# Patient Record
Sex: MALE | Race: WHITE | Employment: OTHER | URBAN - METROPOLITAN AREA
[De-identification: names, ages, dates, MRNs, and addresses within clinical notes are randomized per-mention and may not be internally consistent; named-entity substitution may affect disease eponyms.]

---

## 2017-12-04 PROBLEM — E11.42 DIABETIC POLYNEUROPATHY ASSOCIATED WITH TYPE 2 DIABETES MELLITUS (HCC): Status: ACTIVE | Noted: 2017-12-04

## 2017-12-04 PROBLEM — G35 MULTIPLE SCLEROSIS (HCC): Status: ACTIVE | Noted: 2017-12-04

## 2018-08-22 PROBLEM — F41.9 ANXIETY: Status: ACTIVE | Noted: 2018-08-22

## 2019-05-20 RX ORDER — SODIUM CHLORIDE 0.9 % (FLUSH) 0.9 %
5-40 SYRINGE (ML) INJECTION EVERY 8 HOURS
Status: CANCELLED | OUTPATIENT
Start: 2019-05-20

## 2019-05-20 RX ORDER — SODIUM CHLORIDE 0.9 % (FLUSH) 0.9 %
5-40 SYRINGE (ML) INJECTION AS NEEDED
Status: CANCELLED | OUTPATIENT
Start: 2019-05-20

## 2019-05-23 ENCOUNTER — HOSPITAL ENCOUNTER (OUTPATIENT)
Age: 69
Setting detail: OUTPATIENT SURGERY
Discharge: HOME | End: 2019-05-23
Attending: ORTHOPAEDIC SURGERY | Admitting: ORTHOPAEDIC SURGERY
Payer: MEDICARE

## 2019-05-23 ENCOUNTER — ANESTHESIA EVENT (OUTPATIENT)
Dept: SURGERY | Age: 69
End: 2019-05-23
Payer: MEDICARE

## 2019-05-23 ENCOUNTER — ANESTHESIA (OUTPATIENT)
Dept: SURGERY | Age: 69
End: 2019-05-23
Payer: MEDICARE

## 2019-05-23 VITALS
BODY MASS INDEX: 24.39 KG/M2 | DIASTOLIC BLOOD PRESSURE: 66 MMHG | SYSTOLIC BLOOD PRESSURE: 131 MMHG | TEMPERATURE: 98 F | RESPIRATION RATE: 16 BRPM | HEART RATE: 43 BPM | WEIGHT: 190 LBS | OXYGEN SATURATION: 96 %

## 2019-05-23 LAB — GLUCOSE BLD STRIP.AUTO-MCNC: 96 MG/DL (ref 65–100)

## 2019-05-23 PROCEDURE — 74011250636 HC RX REV CODE- 250/636

## 2019-05-23 PROCEDURE — 76010000154 HC OR TIME FIRST 0.5 HR: Performed by: ORTHOPAEDIC SURGERY

## 2019-05-23 PROCEDURE — 77030036649: Performed by: ORTHOPAEDIC SURGERY

## 2019-05-23 PROCEDURE — 74011250636 HC RX REV CODE- 250/636: Performed by: ANESTHESIOLOGY

## 2019-05-23 PROCEDURE — 74011000250 HC RX REV CODE- 250: Performed by: ORTHOPAEDIC SURGERY

## 2019-05-23 PROCEDURE — 82962 GLUCOSE BLOOD TEST: CPT

## 2019-05-23 PROCEDURE — 76210000063 HC OR PH I REC FIRST 0.5 HR: Performed by: ORTHOPAEDIC SURGERY

## 2019-05-23 PROCEDURE — 76060000032 HC ANESTHESIA 0.5 TO 1 HR: Performed by: ORTHOPAEDIC SURGERY

## 2019-05-23 PROCEDURE — 74011250637 HC RX REV CODE- 250/637: Performed by: ANESTHESIOLOGY

## 2019-05-23 PROCEDURE — 74011250636 HC RX REV CODE- 250/636: Performed by: ORTHOPAEDIC SURGERY

## 2019-05-23 PROCEDURE — 76210000020 HC REC RM PH II FIRST 0.5 HR: Performed by: ORTHOPAEDIC SURGERY

## 2019-05-23 PROCEDURE — 77030019908 HC STETH ESOPH SIMS -A: Performed by: ANESTHESIOLOGY

## 2019-05-23 RX ORDER — FENTANYL CITRATE 50 UG/ML
100 INJECTION, SOLUTION INTRAMUSCULAR; INTRAVENOUS ONCE
Status: DISCONTINUED | OUTPATIENT
Start: 2019-05-23 | End: 2019-05-23 | Stop reason: HOSPADM

## 2019-05-23 RX ORDER — SODIUM CHLORIDE 0.9 % (FLUSH) 0.9 %
5-40 SYRINGE (ML) INJECTION EVERY 8 HOURS
Status: DISCONTINUED | OUTPATIENT
Start: 2019-05-23 | End: 2019-05-23 | Stop reason: HOSPADM

## 2019-05-23 RX ORDER — SODIUM CHLORIDE, SODIUM LACTATE, POTASSIUM CHLORIDE, CALCIUM CHLORIDE 600; 310; 30; 20 MG/100ML; MG/100ML; MG/100ML; MG/100ML
75 INJECTION, SOLUTION INTRAVENOUS CONTINUOUS
Status: DISCONTINUED | OUTPATIENT
Start: 2019-05-23 | End: 2019-05-23 | Stop reason: HOSPADM

## 2019-05-23 RX ORDER — LIDOCAINE HYDROCHLORIDE 10 MG/ML
0.1 INJECTION INFILTRATION; PERINEURAL AS NEEDED
Status: DISCONTINUED | OUTPATIENT
Start: 2019-05-23 | End: 2019-05-23 | Stop reason: HOSPADM

## 2019-05-23 RX ORDER — CEFAZOLIN SODIUM/WATER 2 G/20 ML
2 SYRINGE (ML) INTRAVENOUS ONCE
Status: COMPLETED | OUTPATIENT
Start: 2019-05-23 | End: 2019-05-23

## 2019-05-23 RX ORDER — MIDAZOLAM HYDROCHLORIDE 1 MG/ML
2 INJECTION, SOLUTION INTRAMUSCULAR; INTRAVENOUS ONCE
Status: DISCONTINUED | OUTPATIENT
Start: 2019-05-23 | End: 2019-05-23 | Stop reason: HOSPADM

## 2019-05-23 RX ORDER — SODIUM CHLORIDE 0.9 % (FLUSH) 0.9 %
5-40 SYRINGE (ML) INJECTION AS NEEDED
Status: DISCONTINUED | OUTPATIENT
Start: 2019-05-23 | End: 2019-05-23 | Stop reason: HOSPADM

## 2019-05-23 RX ORDER — LIDOCAINE HYDROCHLORIDE 20 MG/ML
INJECTION, SOLUTION EPIDURAL; INFILTRATION; INTRACAUDAL; PERINEURAL AS NEEDED
Status: DISCONTINUED | OUTPATIENT
Start: 2019-05-23 | End: 2019-05-23 | Stop reason: HOSPADM

## 2019-05-23 RX ORDER — BUPIVACAINE HYDROCHLORIDE 5 MG/ML
INJECTION, SOLUTION EPIDURAL; INTRACAUDAL AS NEEDED
Status: DISCONTINUED | OUTPATIENT
Start: 2019-05-23 | End: 2019-05-23 | Stop reason: HOSPADM

## 2019-05-23 RX ORDER — OXYCODONE HYDROCHLORIDE 5 MG/1
5 TABLET ORAL
Status: DISCONTINUED | OUTPATIENT
Start: 2019-05-23 | End: 2019-05-23 | Stop reason: HOSPADM

## 2019-05-23 RX ORDER — PROPOFOL 10 MG/ML
INJECTION, EMULSION INTRAVENOUS
Status: DISCONTINUED | OUTPATIENT
Start: 2019-05-23 | End: 2019-05-23 | Stop reason: HOSPADM

## 2019-05-23 RX ORDER — PROPOFOL 10 MG/ML
INJECTION, EMULSION INTRAVENOUS AS NEEDED
Status: DISCONTINUED | OUTPATIENT
Start: 2019-05-23 | End: 2019-05-23 | Stop reason: HOSPADM

## 2019-05-23 RX ORDER — HYDROMORPHONE HYDROCHLORIDE 2 MG/ML
0.5 INJECTION, SOLUTION INTRAMUSCULAR; INTRAVENOUS; SUBCUTANEOUS
Status: DISCONTINUED | OUTPATIENT
Start: 2019-05-23 | End: 2019-05-23 | Stop reason: HOSPADM

## 2019-05-23 RX ORDER — LIDOCAINE HYDROCHLORIDE 10 MG/ML
INJECTION INFILTRATION; PERINEURAL AS NEEDED
Status: DISCONTINUED | OUTPATIENT
Start: 2019-05-23 | End: 2019-05-23 | Stop reason: HOSPADM

## 2019-05-23 RX ORDER — OXYCODONE HYDROCHLORIDE 5 MG/1
10 TABLET ORAL
Status: DISCONTINUED | OUTPATIENT
Start: 2019-05-23 | End: 2019-05-23 | Stop reason: HOSPADM

## 2019-05-23 RX ORDER — FAMOTIDINE 20 MG/1
20 TABLET, FILM COATED ORAL ONCE
Status: COMPLETED | OUTPATIENT
Start: 2019-05-23 | End: 2019-05-23

## 2019-05-23 RX ADMIN — PROPOFOL 140 MCG/KG/MIN: 10 INJECTION, EMULSION INTRAVENOUS at 09:21

## 2019-05-23 RX ADMIN — Medication 2 G: at 09:25

## 2019-05-23 RX ADMIN — LIDOCAINE HYDROCHLORIDE 60 MG: 20 INJECTION, SOLUTION EPIDURAL; INFILTRATION; INTRACAUDAL; PERINEURAL at 09:31

## 2019-05-23 RX ADMIN — SODIUM CHLORIDE, SODIUM LACTATE, POTASSIUM CHLORIDE, AND CALCIUM CHLORIDE 75 ML/HR: 600; 310; 30; 20 INJECTION, SOLUTION INTRAVENOUS at 08:31

## 2019-05-23 RX ADMIN — FAMOTIDINE 20 MG: 20 TABLET ORAL at 08:31

## 2019-05-23 RX ADMIN — PROPOFOL 50 MG: 10 INJECTION, EMULSION INTRAVENOUS at 09:21

## 2019-05-23 RX ADMIN — PROPOFOL 40 MG: 10 INJECTION, EMULSION INTRAVENOUS at 09:33

## 2019-05-23 NOTE — ANESTHESIA PREPROCEDURE EVALUATION
Relevant Problems   No relevant active problems       Anesthetic History               Review of Systems / Medical History  Patient summary reviewed and pertinent labs reviewed    Pulmonary                   Neuro/Psych         Neuromuscular disease     Cardiovascular                  Exercise tolerance: >4 METS     GI/Hepatic/Renal                Endo/Other    Diabetes: type 2         Other Findings   Comments: Multiple sclerosis         Physical Exam    Airway  Mallampati: II  TM Distance: > 6 cm  Neck ROM: normal range of motion   Mouth opening: Normal     Cardiovascular  Regular rate and rhythm,  S1 and S2 normal,  no murmur, click, rub, or gallop  Rhythm: regular  Rate: normal         Dental  No notable dental hx       Pulmonary  Breath sounds clear to auscultation               Abdominal         Other Findings            Anesthetic Plan    ASA: 2  Anesthesia type: total IV anesthesia      Post-op pain plan if not by surgeon: peripheral nerve block single      Anesthetic plan and risks discussed with: Patient

## 2019-05-23 NOTE — ANESTHESIA POSTPROCEDURE EVALUATION
Procedure(s):  LEFT MEDIAL EPICONDYLITIS DEBRIDEMENT WITH TENEX / ULTRA SOUND / PLEXUS.    total IV anesthesia    Anesthesia Post Evaluation      Multimodal analgesia: multimodal analgesia not used between 6 hours prior to anesthesia start to PACU discharge  Patient location during evaluation: PACU  Patient participation: complete - patient participated  Level of consciousness: awake and alert  Pain management: adequate  Airway patency: patent  Anesthetic complications: no  Cardiovascular status: hemodynamically stable  Respiratory status: acceptable  Hydration status: acceptable        Vitals Value Taken Time   /66 5/23/2019 10:15 AM   Temp 36.5 °C (97.7 °F) 5/23/2019  9:49 AM   Pulse 43 5/23/2019 10:15 AM   Resp 14 5/23/2019  9:54 AM   SpO2 96 % 5/23/2019 10:15 AM   Vitals shown include unvalidated device data.

## 2019-05-23 NOTE — DISCHARGE INSTRUCTIONS
Keep dressing clean, dry and intact until post op day number 3-4. Then may shower, pat dry as long as tegaderm  Is in place and keep covered until follow up. Do not scrub incision or submerge under water. Move fingers and elbow, elevate, and ice to prevent swelling. No heavy lifting. DIET  · Clear liquids until no nausea or vomiting; then light diet for the first day. · Advance to regular diet on second day, unless your doctor orders otherwise. · If nausea and vomiting continues, call your doctor. PAIN  · Take pain medication as directed by your doctor. · Call your doctor if pain is NOT relieved by medication. · DO NOT take aspirin of blood thinners unless directed by your doctor. CALL YOUR DOCTOR IF   · Excessive bleeding that does not stop after holding pressure over the area  · Temperature of 101 degrees F or above  · Excessive redness, swelling or bruising, and/ or green or yellow, smelly discharge from incision    AFTER ANESTHESIA   · For the first 24 hours: DO NOT Drive, Drink alcoholic beverages, or Make important decisions. · Be aware of dizziness following anesthesia and while taking pain medication. After general anesthesia or intravenous sedation, for 24 hours or while taking prescription Narcotics:  · Limit your activities  · A responsible adult needs to be with you for the next 24 hours  · Do not drive and operate hazardous machinery  · Do not make important personal or business decisions  · Do  not drink alcoholic beverages  · If you have not urinated within 8 hours after discharge, please contact your surgeon on call. *  Please give a list of your current medications to your Primary Care Provider. *  Please update this list whenever your medications are discontinued, doses are      changed, or new medications (including over-the-counter products) are added. *  Please carry medication information at all times in case of emergency situations.     These are general instructions for a healthy lifestyle:  No smoking/ No tobacco products/ Avoid exposure to second hand smoke  Surgeon General's Warning:  Quitting smoking now greatly reduces serious risk to your health. Obesity, smoking, and sedentary lifestyle greatly increases your risk for illness  A healthy diet, regular physical exercise & weight monitoring are important for maintaining a healthy lifestyle    You may be retaining fluid if you have a history of heart failure or if you experience any of the following symptoms:  Weight gain of 3 pounds or more overnight or 5 pounds in a week, increased swelling in our hands or feet or shortness of breath while lying flat in bed. Please call your doctor as soon as you notice any of these symptoms; do not wait until your next office visit. Recognize signs and symptoms of STROKE:  F-face looks uneven  A-arms unable to move or move unevenly  S-speech slurred or non-existent  T-time-call 911 as soon as signs and symptoms begin-DO NOT go       Back to bed or wait to see if you get better-TIME IS BRAIN.

## 2019-05-23 NOTE — BRIEF OP NOTE
BRIEF OPERATIVE NOTE    Date of Procedure: 5/23/2019   Preoperative Diagnosis: Golfers elbow of left upper extremity [M77.02]  Postoperative Diagnosis: Golfers elbow of left upper extremity [M77.02]    Procedure(s):  LEFT MEDIAL EPICONDYLITIS DEBRIDEMENT WITH TENEX / ULTRA SOUND / PLEXUS  Surgeon(s) and Role:     * Aisha Pantoja MD - Primary         Surgical Assistant: NADYA    Surgical Staff:  Circ-1: Lucho Ocampo RN  Scrub Tech-1: Cristel Needs  Event Time In Time Out   Incision Start 0932    Incision Close 0941      Anesthesia: MAC   Estimated Blood Loss: MINIMAL  Specimens: * No specimens in log *   Findings: SEE DICTATION   Complications: NONE  Implants: * No implants in log *

## 2019-05-30 NOTE — OP NOTES
Operative Report       DOS:  5/23/19  Preoperative diagnosis:  Golfers elbow of left upper extremity [M77.02]    Postoperative diagnosis: Golfers elbow of left upper extremity [M77.02]    Surgeon(s) and Role:     * Amy Maurer MD - Primary     Anesthesia: MAC Local with MAC. Procedures: Procedure(s):  LEFT MEDIAL EPICONDYLITIS DEBRIDEMENT WITH Ilda Huerta / Haylee Watts / PLEXUS        SURGEON: Kamla Selby MD.   ANESTHESIA: Local with MAC.   EBL/IV FLUIDS: Per Anesthesia. COMPLICATIONS: None. DISPOSITION: Stable to recovery room. INDICATIONS FOR PROCEDURE: The patient is a pleasant 69-year-old male with history of chronic left medial epicondylitis that has failed nonoperative measures. After both operative and nonoperative treatment options were discussed, the decision was made to go ahead with a left percutaneous tenotomy and debridement of the common flexor of the elbow utilizing ultrasound and Tenex . Risks and benefits of the procedure were discussed including, but not limited to bleeding, infection, injury to adjacent structures, elbow stiffness, need for additional procedures, wound dehiscence, scar formation, incomplete resolution of symptoms, recurrence of symptoms, and transient neuropraxia. Informed consent was obtained. PROCEDURE IN DETAIL: The patient was seen and marked in the preoperative suite. She was taken back to the OR, placed on the table in supine position. Left upper extremity was prepped and draped in standard  sterile fashion. Formal timeout was performed confirming the patient identification, preoperative antibiotics as well as planned operative procedure. The anatomy was identified and the diseased tissue visualized and confirmed on the common flexor of the left elbow. This was confirmed under ultrasound. Care was taken to protect the ulnar nerve. We infiltrated the planned incision site with lidocaine.   A small incision was made with an 11 blade to puncture the skin. Sterile sleeve was placed over the ultrasound transducer to identify the diseased pathology. We then placed the TENEX instrument through the skin puncture to debride the common flexor tendon of the left elbow. The instrument was introduced through a small puncture wound, advancing it to the diseased tissue, confirming under ultrasound. Once the tip of the instrument was confirmed in pathologic tissue, the footpedal was depressed and the tendon was incised along its length, cutting and removing diseased tissue. Once we got decreased resistance, instrument was removed. Steri-Strips were placed with a Tegaderm. The patient was taken to recovery, having tolerated the procedure well. POSTOPERATIVE CARE: Postoperative instructions were given per elbow tenotomy protocol. Follow up in 2 weeks for a wound check.     Closure: Primary    Complications: None     Signed By: Leah Maya MD

## 2019-06-06 ENCOUNTER — HOSPITAL ENCOUNTER (OUTPATIENT)
Dept: PHYSICAL THERAPY | Age: 69
Discharge: HOME | End: 2019-06-06
Payer: MEDICARE

## 2019-06-06 PROCEDURE — 97140 MANUAL THERAPY 1/> REGIONS: CPT

## 2019-06-06 PROCEDURE — 97165 OT EVAL LOW COMPLEX 30 MIN: CPT

## 2019-06-06 PROCEDURE — 97110 THERAPEUTIC EXERCISES: CPT

## 2019-06-10 NOTE — THERAPY EVALUATION
[facility] 1950 Primary: [employer] Medicare Advantage Secondary:  [facility] [place] [address] [facility], [address], [place] [address]. [phone]   [phone] OUTPATIENT OCCUPATIONAL THERAPY:Initial Assessment 2019 ICD-10: Treatment Diagnosis: Pain in left elbow (M25.522)Stiffness of left elbow, not elsewhere classified (M25.622) Precautions/Allergies:  
Hay fever and allergy relief TREATMENT PLAN: 
Effective Dates: 2019 TO 2019 (90 days). Frequency/Duration: 1-2 times a week for 90 Day(s) MEDICAL/REFERRING DIAGNOSIS: 
Medial epicondylitis, left elbow [M77.02] DATE OF ONSET: Several weeks ago DATE OF SURGERY: 2019 REFERRING PHYSICIAN: Chiquis Escamilla MD MD Orders: Evaluate and treat Return MD Appointment: 6 weeks INITIAL ASSESSMENT:    [facility] with decreased functional use, strength and range of motion of his left elbow and upper extremity that is affecting his independence with activities of daily living and ability to perform job tasks. I feel that  [facility] will benefit from skilled occupational therapy to maximize the functional use of his elbow and upper extremity in daily activities . PROBLEM LIST (Impacting functional limitations): 1. Pain in left elbow. 2. Decreased motion and stiffness in left elbow. Alex Worley 3. Decreased strength in left hand. INTERVENTIONS PLANNED: (Treatment may consist of any combination of the following) 1. Modalities that may include fluidotherapy, paraffin, ultrasound, and light therapy. 2. Therapeutic exercise including a home exercise program. 
3. Manual therapy. 4. Therapeutic activities. GOALS: (Goals have been discussed and agreed upon with patient.) Short-Term Functional Goals: Time Frame: 4 weeks 1. Decrease pain to 4 to allow patient to perform self care tasks.  
2. Increase motion in left elbow by 5 degrees to improve functional use of upper extremity in ADL activities. 3. Increase strength in left hand by 5 pounds to allow patient to  and lift objects during self care activities. Discharge Goals: Time Frame: 12 weeks 1. Decrease pain to 3 to allow patient to perform all household  tasks. 2. Increase motion in left elbow by 10 degreees to allow patient to perform all ADL activities. 3. Increase strength in left hand by 10 pounds to allow patient to , lift, hold, and carry heavy objects. OUTCOME MEASURE:  
Tool Used: Disabilities of the Arm, Shoulder and Hand (DASH) Questionnaire - Quick Version Score:  Initial: 28/55  Most Recent: X/55 (Date: -- ) Interpretation of Score: The DASH is designed to measure the activities of daily living in person's with upper extremity dysfunction or pain. Each section is scored on a 1-5 scale, 5 representing the greatest disability. The scores of each section are added together for a total score of 55. MEDICAL NECESSITY:  
· Patient is expected to demonstrate progress in strength and range of motion to decrease assistance required with ADL and household activities. Maira Smith REASON FOR SERVICES/OTHER COMMENTS: 
· The patient has limited motion, strength and function in his left U.E.. Total Duration: OT Patient Time In/Time Out Time In: 0300 Time Out: 7261 Rehabilitation Potential For Stated Goals: Good Regarding Avie Socks Brule's therapy, I certify that the treatment plan above will be carried out by a therapist or under their direction. Thank you for this referral, Jorden Soliz, OT Referring Physician Signature: Rome Lima MD _______________________________ Date _____________ PAIN/SUBJECTIVE:  
Initial: Pain Intensity 1: 5(increasing to 8 with elbow motion) Pain Location 1: Elbow Pain Orientation 1: Left   Post Session:  4/10 OCCUPATIONAL PROFILE & HISTORY:  
History of Injury/Illness (Reason for Referral): The patient began having left elbow pain after working out at [employer]. Past Medical History/Comorbidities:  
Mr. Hank Landau  has a past medical history of Anxiety, Diabetic polyneuropathy associated with type 2 diabetes mellitus ([facility] Elevated blood pressure reading without diagnosis of hypertension, Hyperlipidemia, Left elbow pain, Memory loss due to medical condition, MS (multiple sclerosis) ([facility] Nonspecific abnormal results of liver function study, and Type II diabetes mellitus ([facility] Mr. Hank Landau  has a past surgical history that includes hx hernia repair; hx tonsillectomy; hx colonoscopy (2015); and hx other surgical (50 yrs ago). Social History/Living Environment:  
Home Environment: Private residence Prior Level of Function/Work/Activity: 
independent Dominant Side:  
      RIGHT Ambulatory/Rehab Services H2 Model Falls Risk Assessment Risk Factors: 
     (1)  Gender [Male] Ability to Rise from Chair: 
     (0)  Ability to rise in a single movement Falls Prevention Plan: No modifications necessary Total: (5 or greater = High Risk): 1 ©2010 [facility] [address] [place] [address] States Patent #[id number]. Federal Law prohibits the replication, distribution or use without written permission from [facility] [employer] Current Medications:   
  
Current Outpatient Medications:  
  sertraline (ZOLOFT) 100 mg tablet, Take 100 mg by mouth daily. , Disp: , Rfl:   
Date Last Reviewed:  6/6/2019 Complexity Level: Brief history (0):  LOW COMPLEXITY ASSESSMENT OF OCCUPATIONAL PERFORMANCE:  
RANGE OF MOTION:    
· AROM: The patient's left elbow motion is with in functional limited however he states that it is stiff at times. STRENGTH:  Not tested yet due to recent surgery. SENSATION:  The patient reports tingling in his left hand. Physical Skills Involved: 1. Range of Motion 2. Strength 3. Sensation 4. Pain (acute) 5. Edema Cognitive Skills Affected (resulting in the inability to perform in a timely and safe manner): 1. none Psychosocial Skills Affected: 1. none Number of elements that affect the Plan of Care[de-identified] 5+:  HIGH COMPLEXITY CLINICAL DECISION MAKING:  
Clinical Decision-Making Assessment:    
Assessment process, impact of co-morbidities, assessment modification\need for assistance, and selection of interventions: Analytical Complexity:LOW COMPLEXITY

## 2019-06-10 NOTE — PROGRESS NOTES
Liza Brandt [facility]  : 1950  Primary: Ryanne Marrero Medicare Advantage  Secondary:  [address] at [place] [id number] [place] [address] [facility]  [phone]   [phone]       OUTPATIENT OCCUPATIONAL THERAPY: Daily Treatment Note 2019  Visit Count:  1    ICD-10: Treatment Diagnosis: Pain in left elbow (M25.522)Stiffness of left elbow, not elsewhere classified (K24.491)  Precautions/Allergies:   Hay fever and allergy relief   TREATMENT PLAN:  Effective Dates: 2019 TO 2019 (90 days). Frequency/Duration: 1-2 times a week for 90 Day(s)  Pre-treatment Symptoms/Complaints:  I am still having a lot of pain in my elbow.   Pain: Initial: Pain Intensity 1: 5(increasing to 8 with elbow motion)  Pain Location 1: Elbow  Pain Orientation 1: Left  Post Session:  4/10   Medications Last Reviewed:  2019  Updated Objective Findings:  See evaluation note from today   TREATMENT:       Manual Therapy: (Soft Tissue Mobilization Duration  Duration: 15 Minutes  Duration: 15 Minutes): Technique: Retrograde massage, Connective tissue(followed by Light tx & PROM)  Tissue Mobilized: Scar/adhesion  LUE Soft Tissue Mobilization: Yes   Therapeutic Exercise:                                                                               : The patient was instructed in a home exercise program.                                                Date:  19 Date: Date: Date: Date:   Activity/Exercise Parameters Parameters Parameters Parameters Parameters   AROM during Fluidotherapy MH L elbow  5 min       Paraffin with Stretch          Retrograde massage, Friction Scar massage, Joint Mobilization   15 min  Light tx       Scarf Curl          Washer Game        Individual Gripper          Hand Milton          Ice massage   3 min       Pegs          Clothes Pins          A-R Bar          Exerstick          Velcro-Roll          A&PROM EX 15 min       RESISTIVE EXERCISES Weight/ Sets/Reps   Weight/ Sets/Reps Weight/ Sets/Reps Weight/ Sets/Reps Weight/ Sets/Reps   WEIGHT WELL        Sup/Pro        UD/RD        Wrist Flex/Ext        Free Weights          UBE(Minutes)          Nautilus        Compound Row        Vertical Chest        Overhead Press                    HEP: As above; handouts given to patient for all exercises. Therapeutic Modalities:        Left Elbow Heat  Type: Moist pack  Duration : 5 minutes  Patient Position: Sitting                                      Joint Mobilization:        Treatment Times:  · Therapeutic Exercise: 15 minutes  · Manual Therapy: 15 minutes  · Parafin:  minutes  · Whirlpool:  minutes  · Other:  minutes    Treatment/Session Summary:    · Response to Treatment:  Patients tolerated treatment well with no complications. Upon completion of treatment, skin condition was normal.  · Communication/Consultation:  None today  · Equipment provided today:  None today  · Recommendations/Intent for next treatment session: Next visit will focus on increasing  Motion, strength and function and decreasing pain. .    Total Treatment Billable Duration:  30 minutes  OT Patient Time In/Time Out  Time In: 0300  Time Out: 0345  Katia Wei OT    Future Appointments   Date Time Provider Bushra Marj   6/13/2019  3:00 PM Armando Duenas OT [facility]

## 2019-06-13 ENCOUNTER — HOSPITAL ENCOUNTER (OUTPATIENT)
Dept: PHYSICAL THERAPY | Age: 69
Discharge: HOME | End: 2019-06-13
Payer: MEDICARE

## 2019-06-13 PROCEDURE — 97140 MANUAL THERAPY 1/> REGIONS: CPT

## 2019-06-13 PROCEDURE — 97110 THERAPEUTIC EXERCISES: CPT

## 2019-06-13 NOTE — PROGRESS NOTES
[facility]  : 1950  Primary: Jennifer Marre[employer] Medicare Advantage  Secondary:  [address]  at [place] [address] [facility] [address]  [phone]   [phone]       OUTPATIENT OCCUPATIONAL THERAPY: Daily Treatment Note 2019  Visit Count:  2    ICD-10: Treatment Diagnosis: Pain in left elbow (M25.522)Stiffness of left elbow, not elsewhere classified (X49.254)  Precautions/Allergies:   Hay fever and allergy relief   TREATMENT PLAN:  Effective Dates: 2019 TO 2019 (90 days). Frequency/Duration: 1-2 times a week for 90 Day(s)  Pre-treatment Symptoms/Complaints:  I can move my elbow well but it is tender. .  Pain: Initial: Pain Intensity 1: 3  Pain Location 1: Elbow  Pain Orientation 1: Left  Post Session:  2/10   Medications Last Reviewed:  2019  Updated Objective Findings:  See evaluation note from today   TREATMENT:       Manual Therapy: (Soft Tissue Mobilization Duration  Duration: 15 Minutes  Duration: 15 Minutes): Technique: Retrograde massage, Connective tissue(followed by Light tx.)  Tissue Mobilized: Scar/adhesion  LUE Soft Tissue Mobilization: Yes   Therapeutic Exercise:                                                                               : The patient was instructed in a home exercise program.                                                Date:  19 Date:  19 Date: Date: Date:   Activity/Exercise Parameters Parameters Parameters Parameters Parameters   AROM during Fluidotherapy MH L elbow  5 min MH L elbow      Paraffin with Stretch          Retrograde massage, Friction Scar massage, Joint Mobilization   15 min  Light tx 15 min  Light tx      Scarf Curl          Washer Game        Individual Gripper          Hand Greig          Ice massage   3 min 3 min      Pegs          Clothes Pins          A-R Bar          Exerstick          Isometric gripping    3 min      A&PROM EX 15 min 20 min      RESISTIVE EXERCISES Weight/ Sets/Reps   Weight/ Sets/Reps Weight/ Sets/Reps Weight/ Sets/Reps Weight/ Sets/Reps   WEIGHT WELL        Sup/Pro        UD/RD        Wrist Flex/Ext        Free Weights          UBE(Minutes)          Nautilus        Compound Row        Vertical Chest        Overhead Press                    HEP: As above; handouts given to patient for all exercises. Therapeutic Modalities:        Left Elbow Heat  Type: Moist pack  Duration : 5 minutes  Patient Position: Sitting                                      Joint Mobilization:        Treatment Times:  · Therapeutic Exercise: 30 minutes  · Manual Therapy: 15 minutes  · Parafin:  minutes  · Whirlpool:  minutes  · Other:  minutes    Treatment/Session Summary:    · Response to Treatment:  Patients tolerated treatment well with no complications. Upon completion of treatment, skin condition was normal.  · Communication/Consultation:  None today  · Equipment provided today:  None today  · Recommendations/Intent for next treatment session: Next visit will focus on increasing  Motion, strength and function and decreasing pain. .    Total Treatment Billable Duration:  45 minutes  OT Patient Time In/Time Out  Time In: 0200  Time Out: 0245  Chandrakant Bagley OT    No future appointments.

## 2019-06-20 ENCOUNTER — HOSPITAL ENCOUNTER (OUTPATIENT)
Dept: PHYSICAL THERAPY | Age: 69
Discharge: HOME | End: 2019-06-20
Payer: MEDICARE

## 2019-06-20 PROCEDURE — 97140 MANUAL THERAPY 1/> REGIONS: CPT

## 2019-06-20 PROCEDURE — 97110 THERAPEUTIC EXERCISES: CPT

## 2019-06-20 NOTE — PROGRESS NOTES
[facility]  [facility] 1950  Primary: Harry Marrero Medicare Advantage  Secondary:  [address] at [address]  [address], [address], [place] [address]  [phone]   [phone]       OUTPATIENT OCCUPATIONAL THERAPY: Daily Treatment Note 2019  Visit Count:  3    ICD-10: Treatment Diagnosis: Pain in left elbow (M25.522)Stiffness of left elbow, not elsewhere classified (W41.731)  Precautions/Allergies:   Hay fever and allergy relief   TREATMENT PLAN:  Effective Dates: 2019 TO 2019 (90 days). Frequency/Duration: 1-2 times a week for 90 Day(s)  Pre-treatment Symptoms/Complaints:  I am still having pain when I move my elbow. .  Pain: Initial: Pain Intensity 1: 4  Pain Location 1: Elbow  Pain Orientation 1: Left  Post Session:  3/10   Medications Last Reviewed:  2019  Updated Objective Findings:  None Today   TREATMENT:       Manual Therapy: (Soft Tissue Mobilization Duration  Duration: 15 Minutes  Duration: 15 Minutes): Technique: Retrograde massage, Connective tissue(followed by Light tx & PROM)  Tissue Mobilized: Scar/adhesion  LUE Soft Tissue Mobilization: Yes   Therapeutic Exercise:                                                                               : The patient was instructed in a home exercise program.                                                Date:  19 Date:  19 Date:  19 Date: Date:   Activity/Exercise Parameters Parameters Parameters Parameters Parameters   AROM during Fluidotherapy MH L elbow  5 min MH L elbow MH left elbow 5 min     Paraffin with Stretch          Retrograde massage, Friction Scar massage, Joint Mobilization   15 min  Light tx 15 min  Light tx 15 min  Light tx     Scarf Curl          Washer Game        Individual Gripper          Hand [place]          Ice massage   3 min 3 min 3 min     Pegs          Clothes Pins          A-R Bar          Exerstick          Isometric gripping    3 min 3 min A&PROM EX 15 min 20 min 20 min     RESISTIVE EXERCISES Weight/ Sets/Reps   Weight/ Sets/Reps Weight/ Sets/Reps Weight/ Sets/Reps Weight/ Sets/Reps   WEIGHT WELL        Sup/Pro        UD/RD        Wrist Flex/Ext        Free Weights          UBE(Minutes)          Nautilus        Compound Row        Vertical Chest        Overhead Press                    HEP: As above; handouts given to patient for all exercises. Therapeutic Modalities:        Left Elbow Heat  Type: Moist pack  Duration : 5 minutes  Patient Position: Sitting                                      Joint Mobilization:        Treatment Times:  · Therapeutic Exercise: 30 minutes  · Manual Therapy: 15 minutes  · Parafin:  minutes  · Whirlpool:  minutes  · Other:  minutes    Treatment/Session Summary:    · Response to Treatment:  Patients tolerated treatment well with no complications. Upon completion of treatment, skin condition was normal.  · Communication/Consultation:  None today  · Equipment provided today:  None today  · Recommendations/Intent for next treatment session: Next visit will focus on increasing  Motion, strength and function and decreasing pain. .    Total Treatment Billable Duration:  45 minutes  OT Patient Time In/Time Out  Time In: 0130  Time Out: 0215  Maik Aponte OT    Future Appointments   Date Time Provider Bushra Mcmahan   6/27/2019  3:15 PM Suma Keith OT SFEORPT SFE

## 2019-06-27 ENCOUNTER — HOSPITAL ENCOUNTER (OUTPATIENT)
Dept: PHYSICAL THERAPY | Age: 69
Discharge: HOME | End: 2019-06-27
Payer: MEDICARE

## 2019-06-27 PROCEDURE — 97110 THERAPEUTIC EXERCISES: CPT

## 2019-06-27 PROCEDURE — 97140 MANUAL THERAPY 1/> REGIONS: CPT

## 2019-07-01 NOTE — PROGRESS NOTES
Leesa Meagan [facility]  : 1950  Primary: Andrei Marrero Medicare Advantage  Secondary:  [address]  at [place] [address] [place] [address], [address] [facility]  [phone]   [phone]       OUTPATIENT OCCUPATIONAL THERAPY: Daily Treatment Note 2019  Visit Count:  4    ICD-10: Treatment Diagnosis: Pain in left elbow (M25.522)Stiffness of left elbow, not elsewhere classified (B02.626)  Precautions/Allergies:   Hay fever and allergy relief   TREATMENT PLAN:  Effective Dates: 2019 TO 2019 (90 days). Frequency/Duration: 1-2 times a week for 90 Day(s)  Pre-treatment Symptoms/Complaints:  My elbow is .   Pain: Initial: Pain Intensity 1: 3  Pain Location 1: Elbow  Pain Orientation 1: Left  Post Session:  3/10   Medications Last Reviewed:  2019  Updated Objective Findings:  None Today   TREATMENT:       Manual Therapy: (Soft Tissue Mobilization Duration  Duration: 15 Minutes  Duration: 15 Minutes): Technique: Retrograde massage, Connective tissue(followed by light tx)  Tissue Mobilized: Scar/adhesion  LUE Soft Tissue Mobilization: Yes   Therapeutic Exercise:                                                                               : The patient was instructed in a home exercise program.                                                Date:  19 Date:  19 Date:  19 Date:  19 Date:   Activity/Exercise Parameters Parameters Parameters Parameters Parameters   AROM during Fluidotherapy MH L elbow  5 min MH L elbow MH left elbow 5 min MH Left elbow 5 min    Paraffin with Stretch          Retrograde massage, Friction Scar massage, Joint Mobilization   15 min  Light tx 15 min  Light tx 15 min  Light tx 15 min  Light tx    Scarf Curl          Washer Game    2 min    Individual Gripper          Hand [place]      20 reps    Ice massage   3 min 3 min 3 min 3 min    Pegs          Clothes Pins          A-R Bar          Exerstick      40 reps Isometric gripping    3 min 3 min 2 min    A&PROM EX 15 min 20 min 20 min 15 min    RESISTIVE EXERCISES Weight/ Sets/Reps   Weight/ Sets/Reps Weight/ Sets/Reps Weight/ Sets/Reps Weight/ Sets/Reps   WEIGHT WELL        Sup/Pro        UD/RD        Wrist Flex/Ext        Free Weights      2 lbs  elbow    UBE(Minutes)          Nautilus        Compound Row        Vertical Chest        Overhead Press                    HEP: As above; handouts given to patient for all exercises. Therapeutic Modalities:        Left Elbow Heat  Type: Moist pack  Duration : 5 minutes  Patient Position: Sitting                                      Joint Mobilization:        Treatment Times:  · Therapeutic Exercise: 30 minutes  · Manual Therapy: 15 minutes  · Parafin:  minutes  · Whirlpool:  minutes  · Other:  minutes    Treatment/Session Summary:    · Response to Treatment:  Patients tolerated treatment well with no complications. Upon completion of treatment, skin condition was normal.  · Communication/Consultation:  None today  · Equipment provided today:  None today  · Recommendations/Intent for next treatment session: Next visit will focus on increasing  Motion, strength and function and decreasing pain. .    Total Treatment Billable Duration:  45 minutes  OT Patient Time In/Time Out  Time In: 0288  Time Out: 0330  Marce Hoyos OT    Future Appointments   Date Time Provider Bushra Marj   7/11/2019  1:45 PM Stefanie Costello OT SFEORPT KRISTI

## 2019-07-11 ENCOUNTER — HOSPITAL ENCOUNTER (OUTPATIENT)
Dept: PHYSICAL THERAPY | Age: 69
Discharge: HOME | End: 2019-07-11
Payer: MEDICARE

## 2019-07-11 PROCEDURE — 97110 THERAPEUTIC EXERCISES: CPT

## 2019-07-11 PROCEDURE — 97140 MANUAL THERAPY 1/> REGIONS: CPT

## 2019-07-11 NOTE — PROGRESS NOTES
[facility] [facility]  [facility] 1950  Primary: Andrei Marrero Medicare Advantage  Secondary:  [address]  at [place] [address] [place] [address], [facility] [address]  [phone]   [phone]       OUTPATIENT OCCUPATIONAL THERAPY: Daily Treatment Note 2019  Visit Count:  5    ICD-10: Treatment Diagnosis: Pain in left elbow (M25.522)Stiffness of left elbow, not elsewhere classified (E36.137)  Precautions/Allergies:   Hay fever and allergy relief   TREATMENT PLAN:  Effective Dates: 2019 TO 2019 (90 days).   Frequency/Duration: 1-2 times a week for 90 Day(s)  Pre-treatment Symptoms/Complaints:  My elbow is still painful  Pain: Initial: Pain Intensity 1: 4  Pain Location 1: Elbow  Pain Orientation 1: Left  Post Session:  3/10   Medications Last Reviewed:  2019   Updated Objective Findings:  None Today   TREATMENT:       Manual Therapy: (Soft Tissue Mobilization Duration  Duration: 15 Minutes  Duration: 15 Minutes): Technique: Retrograde massage, Connective tissue(followed by PROM)  Tissue Mobilized: Scar/adhesion  LUE Soft Tissue Mobilization: Yes   Therapeutic Exercise:                                                                               : The patient was instructed in a home exercise program.                                                Date:  19 Date:  19 Date:  19 Date:  19 Date:  19   Activity/Exercise Parameters Parameters Parameters Parameters Parameters   AROM during Fluidotherapy MH L elbow  5 min MH L elbow MH left elbow 5 min MH Left elbow 5 min MH Left elbow  5 min   Paraffin with Stretch          Retrograde massage, Friction Scar massage, Joint Mobilization   15 min  Light tx 15 min  Light tx 15 min  Light tx 15 min  Light tx 15 min     Scarf Curl          Washer Game    2 min 2 min   Individual Gripper          Hand [place]      20 reps 20 reps   Ice massage   3 min 3 min 3 min 3 min 3 min   Pegs          Clothes Pins A-R Bar          Exerstick      40 reps 40 reps   Isometric gripping    3 min 3 min 2 min 2 min   A&PROM EX 15 min 20 min 20 min 15 min 15 min   RESISTIVE EXERCISES Weight/ Sets/Reps   Weight/ Sets/Reps Weight/ Sets/Reps Weight/ Sets/Reps Weight/ Sets/Reps   WEIGHT WELL        Sup/Pro        UD/RD        Wrist Flex/Ext        Free Weights      2 lbs  elbow 3 lbs  elbow   UBE(Minutes)          Nautilus        Compound Row        Vertical Chest        Overhead Press                    HEP: As above; handouts given to patient for all exercises. Therapeutic Modalities:        Left Elbow Heat  Type: Moist pack  Duration : 5 minutes  Patient Position: Sitting                                      Joint Mobilization:        Treatment Times:  · Therapeutic Exercise: 30 minutes  · Manual Therapy: 15 minutes  · Parafin:  minutes  · Whirlpool:  minutes  · Other:  minutes    Treatment/Session Summary:    · Response to Treatment:  Patients tolerated treatment well with no complications. Upon completion of treatment, skin condition was normal.  · Communication/Consultation:  None today  · Equipment provided today:  None today  · Recommendations/Intent for next treatment session: Next visit will focus on increasing  Motion, strength and function and decreasing pain. .    Total Treatment Billable Duration:  45 minutes  OT Patient Time In/Time Out  Time In: 0115  Time Out: 0200  Tiffany Henson OT    No future appointments.

## 2019-07-24 ENCOUNTER — HOSPITAL ENCOUNTER (OUTPATIENT)
Dept: PHYSICAL THERAPY | Age: 69
Discharge: HOME | End: 2019-07-24
Payer: MEDICARE

## 2019-07-24 PROCEDURE — 97110 THERAPEUTIC EXERCISES: CPT

## 2019-07-24 PROCEDURE — 97140 MANUAL THERAPY 1/> REGIONS: CPT

## 2019-07-25 NOTE — PROGRESS NOTES
[facility]  [facility] 1950  Primary: Roberta Simple [employer] Medicare Advantage  Secondary:  [address]  at [facility]  [address] [facility] [address], [address] [address]  [phone]   [phone]       OUTPATIENT OCCUPATIONAL THERAPY: Daily Treatment Note 2019  Visit Count:  6    ICD-10: Treatment Diagnosis: Pain in left elbow (M25.522)Stiffness of left elbow, not elsewhere classified (H98.947)  Precautions/Allergies:   Hay fever and allergy relief   TREATMENT PLAN:  Effective Dates: 2019 TO 2019 (90 days). Frequency/Duration: 1-2 times a week for 90 Day(s)  Pre-treatment Symptoms/Complaints:  My elbow is still sore when I use my free weights.   Pain: Initial: Pain Intensity 1: 3(increasing to 5 when exerciseing )  Pain Location 1: Elbow  Pain Orientation 1: Left  Post Session:  3/10   Medications Last Reviewed:  2019  Updated Objective Findings:  None Today   TREATMENT:       Manual Therapy: (Soft Tissue Mobilization Duration  Duration: 15 Minutes  Duration: 15 Minutes): Technique: Retrograde massage, Connective tissue(followed by US)  Tissue Mobilized: Scar/adhesion  LUE Soft Tissue Mobilization: Yes   Therapeutic Exercise:                                                                               : The patient was instructed in a home exercise program.                                                Date:  2419 Date:  19 Date:  19 Date:  19 Date:  19   Activity/Exercise Parameters Parameters Parameters Parameters Parameters   AROM during Fluidotherapy MH L elbow  5 min MH L elbow MH left elbow 5 min MH Left elbow 5 min MH Left elbow  5 min   Paraffin with Stretch          Retrograde massage, Friction Scar massage, Joint Mobilization   15 min  US 15 min  Light tx 15 min  Light tx 15 min  Light tx 15 min     Scarf Curl          Washer Game 2 min   2 min 2 min   Individual Gripper          Hand [place]   25 reps   20 reps 20 reps   Ice massage 3 min 3 min 3 min 3 min 3 min   Pegs          Clothes Pins          A-R Bar          Exerstick   40 reps   40 reps 40 reps   Isometric gripping   2 min 3 min 3 min 2 min 2 min   A&PROM EX 15 min 20 min 20 min 15 min 15 min   RESISTIVE EXERCISES Weight/ Sets/Reps   Weight/ Sets/Reps Weight/ Sets/Reps Weight/ Sets/Reps Weight/ Sets/Reps   WEIGHT WELL        Sup/Pro        UD/RD        Wrist Flex/Ext        Free Weights   2 lbs'  E;bow   2 lbs  elbow 3 lbs  elbow   UBE(Minutes)          Nautilus        Compound Row        Vertical Chest        Overhead Press                    HEP: As above; handouts given to patient for all exercises. Therapeutic Modalities:        Left Elbow Heat  Type: Moist pack  Duration : 5 minutes  Patient Position: Sitting                                      Joint Mobilization:        Treatment Times:  · Therapeutic Exercise: 30 minutes  · Manual Therapy: 15 minutes  · Parafin:  minutes  · Whirlpool:  minutes  · Other:  minutes    Treatment/Session Summary:    · Response to Treatment:  Patients tolerated treatment well with no complications. Upon completion of treatment, skin condition was normal.  · Communication/Consultation:  None today  · Equipment provided today:  None today  · Recommendations/Intent for next treatment session: Next visit will focus on increasing  Motion, strength and function and decreasing pain. .    Total Treatment Billable Duration:  45 minutes  OT Patient Time In/Time Out  Time In: 0145  Time Out: 0230  Phan Gomes OT    Future Appointments   Date Time Provider Bushra Mcmahan   8/1/2019  2:30 PM Rell iFgueroa OT SFEORPT SFE

## 2019-08-01 ENCOUNTER — HOSPITAL ENCOUNTER (OUTPATIENT)
Dept: PHYSICAL THERAPY | Age: 69
Discharge: HOME | End: 2019-08-01
Payer: MEDICARE

## 2019-08-01 PROCEDURE — 97110 THERAPEUTIC EXERCISES: CPT

## 2019-08-01 PROCEDURE — 97140 MANUAL THERAPY 1/> REGIONS: CPT

## 2019-08-01 NOTE — PROGRESS NOTES
[facility]  [facility] 1950  Primary: Ashley Marrero Medicare Advantage  Secondary:  [address]  at [place] [address] [place] [address],[address], [facility] [address]  [phone]   [phone]       OUTPATIENT OCCUPATIONAL THERAPY: Daily Treatment Note 2019  Visit Count:  7    ICD-10: Treatment Diagnosis: Pain in left elbow (M25.522)Stiffness of left elbow, not elsewhere classified (I00.576)  Precautions/Allergies:   Hay fever and allergy relief   TREATMENT PLAN:  Effective Dates: 2019 TO 2019 (90 days). Frequency/Duration: 1-2 times a week for 90 Day(s)  Pre-treatment Symptoms/Complaints:  My elbow is still very sore and tender.   Pain: Initial: Pain Intensity 1: 4  Pain Location 1: Elbow  Pain Orientation 1: Left  Post Session:  3/10   Medications Last Reviewed:  2019  Updated Objective Findings:  None Today   TREATMENT:       Manual Therapy: (Soft Tissue Mobilization Duration  Duration: 15 Minutes  Duration: 15 Minutes): Technique: Retrograde massage, Connective tissue(followed by US)  Tissue Mobilized: Scar/adhesion  LUE Soft Tissue Mobilization: Yes   Therapeutic Exercise:                                                                               : The patient was instructed in a home exercise program.                                                Date:  2419 Date:  19 Date:  19 Date:  19 Date:  19   Activity/Exercise Parameters Parameters Parameters Parameters Parameters   AROM during Fluidotherapy MH L elbow  5 min MH L elbow  5 min MH left elbow 5 min MH Left elbow 5 min MH Left elbow  5 min   Paraffin with Stretch          Retrograde massage, Friction Scar massage, Joint Mobilization   15 min  US 15 min  Light tx 15 min  Light tx 15 min  Light tx 15 min     Scarf Curl          Washer Game 2 min 2 min  2 min 2 min   Individual Gripper          Hand [place]   25 reps 25 reps  20 reps 20 reps   Ice massage 3 min 3 min 3 min 3 min 3 min Pegs          Clothes Pins          A-R Bar          Exerstick   40 reps 40 reps  40 reps 40 reps   Isometric gripping   2 min 3 min 3 min 2 min 2 min   A&PROM EX 15 min 5 min 20 min 15 min 15 min   RESISTIVE EXERCISES Weight/ Sets/Reps   Weight/ Sets/Reps Weight/ Sets/Reps Weight/ Sets/Reps Weight/ Sets/Reps   WEIGHT WELL        Sup/Pro        UD/RD        Wrist Flex/Ext        Free Weights   2 lbs'  E;bow 3 lbs  elbow  2 lbs  elbow 3 lbs  elbow   UBE(Minutes)          Nautilus        Compound Row        Vertical Chest        Overhead Press                    HEP: As above; handouts given to patient for all exercises. Therapeutic Modalities:        Left Elbow Heat  Type: Moist pack  Duration : 5 minutes  Patient Position: Sitting                                      Joint Mobilization:        Treatment Times:  · Therapeutic Exercise: 30 minutes  · Manual Therapy: 15 minutes  · Parafin:  minutes  · Whirlpool:  minutes  · Other:  minutes    Treatment/Session Summary:    · Response to Treatment:  Patients tolerated treatment well with no complications. Upon completion of treatment, skin condition was normal.  · Communication/Consultation:  None today  · Equipment provided today:  None today  · Recommendations/Intent for next treatment session: Next visit will focus on increasing  Motion, strength and function and decreasing pain. .    Total Treatment Billable Duration:  45 minutes  OT Patient Time In/Time Out  Time In: 0215  Time Out: 0300  Kevon Caldwell OT    No future appointments.

## 2019-08-12 NOTE — THERAPY DISCHARGE
[facility]  [facility] 1950  Primary: Shelby Marrero Medicare Advantage  Secondary:  [address]  at [place] [address], [place] [address].  [phone]   [phone]          OUTPATIENT OCCUPATIONAL THERAPY:Discontinuation Summary 2019   ICD-10: Treatment Diagnosis: Pain in left elbow (M25.522)Stiffness of left elbow, not elsewhere classified (M25.622)  Precautions/Allergies:   Hay fever and allergy relief   TREATMENT PLAN:  Effective Dates: 2019 TO 2019 (90 days). Frequency/Duration: 1-2 times a week for 90 Day(s) MEDICAL/REFERRING DIAGNOSIS:  Medial epicondylitis, left elbow [M77.02]   DATE OF ONSET: Several weeks ago  DATE OF SURGERY: 2019  REFERRING PHYSICIAN: Tilden Jeans, MD MD Orders: Evaluate and treat  Return MD Appointment: 6 weeks     INITIAL ASSESSMENT:   [facility] with decreased functional use, strength and range of motion of his left elbow and upper extremity that is affecting his independence with activities of daily living and ability to perform job tasks. I feel that [facility]. [facility] will benefit from skilled occupational therapy to maximize the functional use of his elbow and upper extremity in daily activities . PROBLEM LIST (Impacting functional limitations):  1. Pain in left elbow. 2. Decreased motion and stiffness in left elbow. .  3. Decreased strength in left hand. INTERVENTIONS PLANNED: (Treatment may consist of any combination of the following)  1. Modalities that may include fluidotherapy, paraffin, ultrasound, and light therapy. 2. Therapeutic exercise including a home exercise program.  3. Manual therapy. 4. Therapeutic activities. GOALS: (Goals have been discussed and agreed upon with patient.) ( GOALS WERE NOT REASSESSED ) Patient called and canceled his appointment and stated he was ready for discharge. Short-Term Functional Goals: Time Frame: 4 weeks  1.  Decrease pain to 4 to allow patient to perform self care tasks. 2. Increase motion in left elbow by 5 degrees to improve functional use of upper extremity in ADL activities. 3. Increase strength in left hand by 5 pounds to allow patient to  and lift objects during self care activities. Discharge Goals: Time Frame: 12 weeks  1. Decrease pain to 3 to allow patient to perform all household  tasks. 2. Increase motion in left elbow by 10 degreees to allow patient to perform all ADL activities. 3. Increase strength in left hand by 10 pounds to allow patient to , lift, hold, and carry heavy objects. OUTCOME MEASURE:   Tool Used: Disabilities of the Arm, Shoulder and Hand (DASH) Questionnaire - Quick Version  Score:  Initial: 28/55  Most Recent: X/55 (Date: -- )   Interpretation of Score: The DASH is designed to measure the activities of daily living in person's with upper extremity dysfunction or pain. Each section is scored on a 1-5 scale, 5 representing the greatest disability. The scores of each section are added together for a total score of 55. MEDICAL NECESSITY:   · Patient is expected to demonstrate progress in strength and range of motion to decrease assistance required with ADL and household activities. .  REASON FOR SERVICES/OTHER COMMENTS:  · The patient has limited motion, strength and function in his left U.E..  Total Duration:       Rehabilitation Potential For Stated Goals: Good  Regarding Maykel Gambino's therapy, I certify that the treatment plan above will be carried out by a therapist or under their direction. Thank you for this referral,  Maria Del Carmen Sandoval, OT     Referring Physician Signature: Tilden Jeans, MD _______________________________ Date _____________     PAIN/SUBJECTIVE:   Initial:     Post Session:  4/10   OCCUPATIONAL PROFILE & HISTORY:   History of Injury/Illness (Reason for Referral): The patient began having left elbow pain after working out at the gym.   Past Medical History/Comorbidities:   Mr. Christin Hills  has a past medical history of Anxiety, Diabetic polyneuropathy associated with type 2 diabetes mellitus ([facility] [address] Elevated blood pressure reading without diagnosis of hypertension, Hyperlipidemia, Left elbow pain, Memory loss due to medical condition, MS (multiple sclerosis) ([facility] [address] Nonspecific abnormal results of liver function study, and Type II diabetes mellitus ([facility] [address] Mr. Christin Hills  has a past surgical history that includes hx hernia repair; hx tonsillectomy; hx colonoscopy (2015); and hx other surgical (50 yrs ago). Social History/Living Environment:      Prior Level of Function/Work/Activity:  independent  Dominant Side:         RIGHT   Ambulatory/Rehab Services H2 Model Falls Risk Assessment   Risk Factors:       (1)  Gender [Male] Ability to Rise from Chair:       (0)  Ability to rise in a single movement   Falls Prevention Plan:       No modifications necessary   Total: (5 or greater = High Risk): 1   ©2010 [facility]. All Rights Reserved. [facility] Patent #[id number]. Federal Law prohibits the replication, distribution or use without written permission from [facility] [employer]   Current Medications:       Current Outpatient Medications:     sertraline (ZOLOFT) 100 mg tablet, Take 100 mg by mouth daily. , Disp: , Rfl:    Date Last Reviewed:  8/1/2019   Complexity Level: Brief history (0):  LOW COMPLEXITY   ASSESSMENT OF OCCUPATIONAL PERFORMANCE:   RANGE OF MOTION:     · AROM: The patient's left elbow motion is with in functional limited however he states that it is stiff at times. STRENGTH:  Not tested yet due to recent surgery. SENSATION:  The patient reports tingling in his left hand. Physical Skills Involved:  1. Range of Motion  2. Strength  3. Sensation  4. Pain (acute)  5. Edema Cognitive Skills Affected (resulting in the inability to perform in a timely and safe manner):   1. none Psychosocial Skills Affected:  1. none   Number of elements that affect the Plan of Care[de-identified] 5+:  HIGH COMPLEXITY   CLINICAL DECISION MAKING:   Clinical Decision-Making Assessment:     Assessment process, impact of co-morbidities, assessment modification\need for assistance, and selection of interventions: Analytical Complexity:LOW COMPLEXITY

## 2019-08-15 ENCOUNTER — HOSPITAL ENCOUNTER (OUTPATIENT)
Dept: PHYSICAL THERAPY | Age: 69
Discharge: HOME | End: 2019-08-15
Payer: MEDICARE

## 2019-09-05 ENCOUNTER — APPOINTMENT (OUTPATIENT)
Dept: PHYSICAL THERAPY | Age: 69
End: 2019-09-05

## 2022-03-19 PROBLEM — E11.42 DIABETIC POLYNEUROPATHY ASSOCIATED WITH TYPE 2 DIABETES MELLITUS (HCC): Status: ACTIVE | Noted: 2017-12-04

## 2022-03-19 PROBLEM — G35 MULTIPLE SCLEROSIS (HCC): Status: ACTIVE | Noted: 2017-12-04

## 2022-03-20 PROBLEM — F41.9 ANXIETY: Status: ACTIVE | Noted: 2018-08-22

## 2023-03-19 ENCOUNTER — APPOINTMENT (OUTPATIENT)
Dept: GENERAL RADIOLOGY | Age: 73
End: 2023-03-19
Payer: MEDICARE

## 2023-03-19 ENCOUNTER — HOSPITAL ENCOUNTER (EMERGENCY)
Age: 73
Discharge: HOME | End: 2023-03-19
Attending: EMERGENCY MEDICINE
Payer: MEDICARE

## 2023-03-19 ENCOUNTER — APPOINTMENT (OUTPATIENT)
Dept: CT IMAGING | Age: 73
End: 2023-03-19
Payer: MEDICARE

## 2023-03-19 VITALS
DIASTOLIC BLOOD PRESSURE: 89 MMHG | SYSTOLIC BLOOD PRESSURE: 151 MMHG | RESPIRATION RATE: 17 BRPM | HEART RATE: 82 BPM | TEMPERATURE: 98.5 F | OXYGEN SATURATION: 97 %
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
## 2023-03-19 DIAGNOSIS — E11.42 DIABETIC POLYNEUROPATHY ASSOCIATED WITH TYPE 2 DIABETES MELLITUS (HCC): ICD-10-CM

## 2023-03-19 DIAGNOSIS — G35 MULTIPLE SCLEROSIS (HCC): ICD-10-CM

## 2023-03-19 DIAGNOSIS — G31.09 FRONTOTEMPORAL DEMENTIA (HCC): ICD-10-CM

## 2023-03-19 DIAGNOSIS — F02.80 FRONTOTEMPORAL DEMENTIA (HCC): ICD-10-CM

## 2023-03-19 LAB
ALBUMIN SERPL-MCNC: 3.3 G/DL (ref 3.2–4.6)
ALBUMIN/GLOB SERPL: 0.8 (ref 0.4–1.6)
ALP SERPL-CCNC: 94 U/L (ref 50–136)
ALT SERPL-CCNC: 21 U/L (ref 12–65)
ANION GAP SERPL CALC-SCNC: 5 MMOL/L (ref 2–11)
AST SERPL-CCNC: 13 U/L (ref 15–37)
BASOPHILS # BLD: 0 K/UL (ref 0–0.2)
BASOPHILS NFR BLD: 0 % (ref 0–2)
BILIRUB SERPL-MCNC: 0.4 MG/DL (ref 0.2–1.1)
BUN SERPL-MCNC: 8 MG/DL (ref 8–23)
CALCIUM SERPL-MCNC: 9.3 MG/DL (ref 8.3–10.4)
CHLORIDE SERPL-SCNC: 106 MMOL/L (ref 101–110)
CK SERPL-CCNC: 54 U/L (ref 21–215)
CO2 SERPL-SCNC: 28 MMOL/L (ref 21–32)
CREAT SERPL-MCNC: 0.78 MG/DL (ref 0.8–1.5)
DIFFERENTIAL METHOD BLD: ABNORMAL
EOSINOPHIL # BLD: 0.1 K/UL (ref 0–0.8)
EOSINOPHIL NFR BLD: 1 % (ref 0.5–7.8)
ERYTHROCYTE [DISTWIDTH] IN BLOOD BY AUTOMATED COUNT: 14 % (ref 11.9–14.6)
GLOBULIN SER CALC-MCNC: 4.1 G/DL (ref 2.8–4.5)
GLUCOSE SERPL-MCNC: 120 MG/DL (ref 65–100)
HCT VFR BLD AUTO: 46.5 % (ref 41.1–50.3)
HGB BLD-MCNC: 14.7 G/DL (ref 13.6–17.2)
IMM GRANULOCYTES # BLD AUTO: 0 K/UL (ref 0–0.5)
IMM GRANULOCYTES NFR BLD AUTO: 0 % (ref 0–5)
INR PPP: 1
LYMPHOCYTES # BLD: 1.2 K/UL (ref 0.5–4.6)
LYMPHOCYTES NFR BLD: 17 % (ref 13–44)
MCH RBC QN AUTO: 27.9 PG (ref 26.1–32.9)
MCHC RBC AUTO-ENTMCNC: 31.6 G/DL (ref 31.4–35)
MCV RBC AUTO: 88.2 FL (ref 82–102)
MONOCYTES # BLD: 0.5 K/UL (ref 0.1–1.3)
MONOCYTES NFR BLD: 7 % (ref 4–12)
NEUTS SEG # BLD: 5.3 K/UL (ref 1.7–8.2)
NEUTS SEG NFR BLD: 75 % (ref 43–78)
NRBC # BLD: 0 K/UL (ref 0–0.2)
PLATELET # BLD AUTO: 199 K/UL (ref 150–450)
PMV BLD AUTO: 8.8 FL (ref 9.4–12.3)
POTASSIUM SERPL-SCNC: 4.3 MMOL/L (ref 3.5–5.1)
PROT SERPL-MCNC: 7.4 G/DL (ref 6.3–8.2)
PROTHROMBIN TIME: 12.7 SEC (ref 12.6–14.3)
RBC # BLD AUTO: 5.27 M/UL (ref 4.23–5.6)
SODIUM SERPL-SCNC: 139 MMOL/L (ref 133–143)
TROPONIN I SERPL HS-MCNC: 8 PG/ML (ref 0–14)
WBC # BLD AUTO: 7.1 K/UL (ref 4.3–11.1)

## 2023-03-19 PROCEDURE — 84484 ASSAY OF TROPONIN QUANT: CPT

## 2023-03-19 PROCEDURE — 71045 X-RAY EXAM CHEST 1 VIEW: CPT

## 2023-03-19 PROCEDURE — 70450 CT HEAD/BRAIN W/O DYE: CPT

## 2023-03-19 PROCEDURE — 72125 CT NECK SPINE W/O DYE: CPT

## 2023-03-19 PROCEDURE — 80053 COMPREHEN METABOLIC PANEL: CPT

## 2023-03-19 PROCEDURE — 82550 ASSAY OF CK (CPK): CPT

## 2023-03-19 PROCEDURE — 99285 EMERGENCY DEPT VISIT HI MDM: CPT

## 2023-03-19 PROCEDURE — 85025 COMPLETE CBC W/AUTO DIFF WBC: CPT

## 2023-03-19 PROCEDURE — 85610 PROTHROMBIN TIME: CPT

## 2023-03-19 PROCEDURE — 93005 ELECTROCARDIOGRAM TRACING: CPT | Performed by: STUDENT IN AN ORGANIZED HEALTH CARE EDUCATION/TRAINING PROGRAM

## 2023-03-19 PROCEDURE — 72170 X-RAY EXAM OF PELVIS: CPT

## 2023-03-19 ASSESSMENT — VISUAL ACUITY: OU: 1

## 2023-03-19 ASSESSMENT — ENCOUNTER SYMPTOMS
SHORTNESS OF BREATH: 0
ABDOMINAL PAIN: 0

## 2023-03-19 ASSESSMENT — PAIN SCALES - GENERAL: PAINLEVEL_OUTOF10: 0

## 2023-03-19 NOTE — DISCHARGE INSTRUCTIONS
Your 's lab work was stable today. Your  CTs of his neck and of his brain did not show any acute findings. Your 's exams were normal.  Your  has remained at his baseline throughout his visit. Please continue to monitor your  and if you notice any symptoms that are concerning to you or if you notice any deviation from his baseline, please return immediately for reevaluation. As we discussed, I did not find a life threatening cause of your symptoms today. However, THAT DOES NOT MEAN IT COULD NOT DEVELOP. If you develop ANY new or worsening symptoms, it is critical that you return for re-evaluation. This includes any symptoms that are concerning to you, especially symptoms such as deviation from neurologic baseline, not moving one of his arms or legs, severe pain, bruising in the chest, difficulty breathing, complaint of chest pain, complaint of abdominal pain. If you do not return for re-evaluation, you risk serious complications, including death.

## 2023-03-19 NOTE — ED NOTES
I have reviewed discharge instructions with the caregiver. The caregiver verbalized understanding. Patient left ED via Discharge Method: wheelchair to Skilled nursing facility with wife. Opportunity for questions and clarification provided. Patient given 0 scripts. To continue your aftercare when you leave the hospital, you may receive an automated call from our care team to check in on how you are doing. This is a free service and part of our promise to provide the best care and service to meet your aftercare needs.  If you have questions, or wish to unsubscribe from this service please call [phone]. Thank you for Choosing our [facility] Emergency Department.         Juarez Greer RN  03/19/23 0713

## 2023-03-19 NOTE — ED TRIAGE NOTES
Pt in by EMS from the [facility], pt found in floor under a dresser. Staff at the [facility] states that the pt appeared to be dressing himself when he fell grabbing the dresser. Em Mansfield found across his abdomen. No head trauma. No LOC.

## 2023-03-19 NOTE — ED PROVIDER NOTES
he states \"I had a bump. \"  He obeys commands. HENT:      Head: Normocephalic and atraumatic. No raccoon eyes, Jones's sign, abrasion, contusion or laceration. Jaw: There is normal jaw occlusion. Comments: Scalp nontender. No signs of skull deformity or trauma     Right Ear: Hearing, tympanic membrane, ear canal and external ear normal. No laceration or tenderness. No mastoid tenderness. No hemotympanum. Left Ear: Hearing, tympanic membrane, ear canal and external ear normal. No laceration or tenderness. No mastoid tenderness. No hemotympanum. Ears:      Comments: No hemotympanum or CSF drainage noted bilaterally. No jones sign bilaterally. Nose: Nose normal.      Comments: No septal hematoma     Mouth/Throat:      Comments: No malocclusion  Eyes:      General: Vision grossly intact. Gaze aligned appropriately. No visual field deficit. Right eye: No foreign body. Left eye: No foreign body. Extraocular Movements: Extraocular movements intact. Conjunctiva/sclera: Conjunctivae normal.      Pupils: Pupils are equal, round, and reactive to light. Funduscopic exam:     Right eye: Red reflex present. Left eye: Red reflex present. Visual Fields: Right eye visual fields normal and left eye visual fields normal.      Comments: No ocular proptosis. No raccoon eyes. Tracks well   Neck:      Trachea: Trachea normal. No tracheal deviation. Cardiovascular:      Rate and Rhythm: Normal rate and regular rhythm. Pulses:           Carotid pulses are 2+ on the right side and 2+ on the left side. Radial pulses are 2+ on the right side and 2+ on the left side. Femoral pulses are 2+ on the right side and 2+ on the left side. Popliteal pulses are 1+ on the right side and 1+ on the left side. Dorsalis pedis pulses are 2+ on the right side and 2+ on the left side.         Posterior tibial pulses are 2+ on the right side and 2+ on the left

## 2023-03-20 LAB
EKG ATRIAL RATE: 82 BPM
EKG DIAGNOSIS: NORMAL
EKG P AXIS: 51 DEGREES
EKG P-R INTERVAL: 172 MS
EKG Q-T INTERVAL: 357 MS
EKG QRS DURATION: 82 MS
EKG QTC CALCULATION (BAZETT): 412 MS
EKG R AXIS: 9 DEGREES
EKG T AXIS: 28 DEGREES
EKG VENTRICULAR RATE: 80 BPM

## (undated) DEVICE — DRAPE,HAND,STERILE: Brand: MEDLINE

## (undated) DEVICE — STERILE HOOK LOCK LATEX FREE ELASTIC BANDAGE 3INX5YD: Brand: HOOK LOCK™

## (undated) DEVICE — SYRINGE EAR 2OZ ULC SLIMMER TIP FLAT BTM SUCT PWR DISP FOR

## (undated) DEVICE — (D)PREP SKN CHLRAPRP APPL 26ML -- CONVERT TO ITEM 371833

## (undated) DEVICE — TX1 PROCEDURE PACK: Brand: TENEX HEALTH TX®

## (undated) DEVICE — SURGICAL PROCEDURE PACK BASIC ST FRANCIS

## (undated) DEVICE — DRAPE, FILM SHEET, 44X65 STERILE: Brand: MEDLINE

## (undated) DEVICE — TX2 PROCEDURE PACK: Brand: TENEX HEALTH TX®